# Patient Record
Sex: MALE | Race: WHITE | Employment: OTHER | URBAN - METROPOLITAN AREA
[De-identification: names, ages, dates, MRNs, and addresses within clinical notes are randomized per-mention and may not be internally consistent; named-entity substitution may affect disease eponyms.]

---

## 2017-01-17 RX ORDER — ATORVASTATIN CALCIUM 20 MG/1
TABLET, FILM COATED ORAL
Qty: 44 TAB | Refills: 0 | OUTPATIENT
Start: 2017-01-17

## 2017-01-17 NOTE — TELEPHONE ENCOUNTER
Patient overdue for fasting labs and a follow-up appointment. Per MD, may authorize refills once appointment is made.

## 2017-02-16 DIAGNOSIS — I11.9 BENIGN HYPERTENSIVE HEART DISEASE WITHOUT HEART FAILURE: ICD-10-CM

## 2017-02-24 RX ORDER — ATORVASTATIN CALCIUM 20 MG/1
TABLET, FILM COATED ORAL
Qty: 44 TAB | Refills: 0 | OUTPATIENT
Start: 2017-02-24

## 2017-02-24 RX ORDER — AMLODIPINE BESYLATE 5 MG/1
TABLET ORAL
Qty: 15 TAB | Refills: 0 | OUTPATIENT
Start: 2017-02-24

## 2017-02-24 RX ORDER — QUINAPRIL 20 MG/1
TABLET ORAL
Qty: 15 TAB | Refills: 0 | OUTPATIENT
Start: 2017-02-24

## 2017-02-24 NOTE — TELEPHONE ENCOUNTER
Received fax from [facility]. No visit scheduled yet. Per MD, note faxed back that pt overdue for visit/fasting labs- once scheduled, can refill.

## 2017-02-28 DIAGNOSIS — I11.9 BENIGN HYPERTENSIVE HEART DISEASE WITHOUT HEART FAILURE: ICD-10-CM

## 2017-02-28 RX ORDER — AMLODIPINE BESYLATE 5 MG/1
TABLET ORAL
Qty: 30 TAB | Refills: 5 | Status: SHIPPED | OUTPATIENT
Start: 2017-02-28

## 2017-02-28 RX ORDER — ATORVASTATIN CALCIUM 20 MG/1
TABLET, FILM COATED ORAL
Qty: 30 TAB | Refills: 5 | Status: SHIPPED | OUTPATIENT
Start: 2017-02-28

## 2017-02-28 RX ORDER — QUINAPRIL 20 MG/1
TABLET ORAL
Qty: 30 TAB | Refills: 5 | Status: SHIPPED | OUTPATIENT
Start: 2017-02-28

## 2017-03-27 ENCOUNTER — HOSPITAL ENCOUNTER (OUTPATIENT)
Dept: GENERAL RADIOLOGY | Age: 62
Discharge: HOME | End: 2017-03-27
Payer: COMMERCIAL

## 2017-03-27 DIAGNOSIS — R06.02 SHORTNESS OF BREATH: ICD-10-CM

## 2017-03-27 PROCEDURE — 71020 XR CHEST PA LAT: CPT

## 2017-04-05 ENCOUNTER — HOSPITAL ENCOUNTER (OUTPATIENT)
Dept: CT IMAGING | Age: 62
Discharge: HOME | End: 2017-04-05
Attending: INTERNAL MEDICINE
Payer: SELF-PAY

## 2017-04-05 DIAGNOSIS — Z87.891 HISTORY OF TOBACCO USE: ICD-10-CM

## 2017-04-05 PROCEDURE — G0297 LDCT FOR LUNG CA SCREEN: HCPCS

## 2019-04-10 ENCOUNTER — HOSPITAL ENCOUNTER (OUTPATIENT)
Dept: GENERAL RADIOLOGY | Age: 64
Discharge: HOME | End: 2019-04-10
Attending: INTERNAL MEDICINE
Payer: COMMERCIAL

## 2019-04-10 DIAGNOSIS — B34.9 VIRAL INFECTION: ICD-10-CM

## 2019-04-10 PROCEDURE — 71046 X-RAY EXAM CHEST 2 VIEWS: CPT

## 2019-04-10 PROCEDURE — 74019 RADEX ABDOMEN 2 VIEWS: CPT

## 2019-05-07 ENCOUNTER — OFFICE VISIT (OUTPATIENT)
Dept: CARDIOLOGY CLINIC | Age: 64
End: 2019-05-07

## 2019-05-07 VITALS
WEIGHT: 175.8 LBS | BODY MASS INDEX: 25.17 KG/M2 | HEART RATE: 68 BPM | DIASTOLIC BLOOD PRESSURE: 70 MMHG | SYSTOLIC BLOOD PRESSURE: 140 MMHG | HEIGHT: 70 IN | OXYGEN SATURATION: 98 %

## 2019-05-07 DIAGNOSIS — E88.81 INSULIN RESISTANCE: ICD-10-CM

## 2019-05-07 DIAGNOSIS — I10 ESSENTIAL HYPERTENSION: Primary | ICD-10-CM

## 2019-05-07 DIAGNOSIS — E78.5 DYSLIPIDEMIA: ICD-10-CM

## 2019-05-07 DIAGNOSIS — R93.1 AGATSTON CAC SCORE, <100: ICD-10-CM

## 2019-05-07 DIAGNOSIS — I11.9 BENIGN HYPERTENSIVE HEART DISEASE WITHOUT HEART FAILURE: ICD-10-CM

## 2019-05-07 DIAGNOSIS — I65.23 BILATERAL CAROTID ARTERY STENOSIS: ICD-10-CM

## 2019-05-07 RX ORDER — ICOSAPENT ETHYL 1000 MG/1
2 CAPSULE ORAL 2 TIMES DAILY WITH MEALS
Qty: 120 CAP | Refills: 5 | Status: SHIPPED | OUTPATIENT
Start: 2019-05-07 | End: 2019-10-20

## 2019-05-07 NOTE — PROGRESS NOTES
Vanessa KARYDrea Dasilva [address]  [address] Luc[address] Lynne, Jr [place]  [place], [address] [facility]    Office [phone]  Fax [phone]  Cell [phone]        Anais Gracia is a 70 y.o. male . Last seen by me in 2015. Assessment  Encounter Diagnoses   Name Primary?  Essential hypertension Yes    Bilateral carotid artery stenosis     Dyslipidemia     Agatston CAC score, <100     Insulin resistance     Benign hypertensive heart disease without heart failure        Recommendations:    1. CAD by CT heart scan 2015 (71, LAD). Stress echo 2013 normal. He does describe exertional fatigue and occasional ROSS particular w. Stairs. Will reassess with stress echo in near future. 2. Mild carotid disease by duplex study in 2013, asx. Recheck duplex study in near future. 3. HTN, controlled on combination therapy. 4. Dislipidemia/pre-diabetes. Significant worsening mostly related to his poor diet (ice cream, PBJs) despite interval weight loss. We had along discussion about clean eating, low carb diet. He seems motivated to do better. Continue Atorva 20/D and replace krill oil with Vascepa 2 capsules twice daily. Then recheck numbers with Dr. Lorenza Jesus in 4 months. 5. Phone follow up after reviewing tests        Follow-up and Dispositions    · Return in about 4 months (around 9/7/2019). Subjective:    Pt reports that he has recently recovered from having influenza for the past 3-4 weeks. He states he now feels good. Pt reports lack of exercise. He reports he has some fatigue when walking up steep stairs. Pt discontinued atorvastatin and BP medication 2 years ago but resumed all medications about 2 weeks ago. Recent labs w/ Dr Lorenza Jesus show  and . He reports he eats a large amount of pistachio ice cream every night. He adds he typically has oatmeal for breakfast, yogurt and banana for lunch, and peanut butter Jelly sandwich for dinner. Pt supplements diet with krill oil. Pt mentions he has family hx of stroke (father). Patient denies any exertional chest pain, dyspnea, palpitations, syncope, orthopnea, edema or paroxysmal nocturnal dyspnea. Cardiac risk factors   HTN yes  DM - prediabetes  Smoking no      Cardiac testing  Echo Oct 2012 - LVH, EF 60%, mild LAE    Stress echo 4/16/2013 - 10 min, +EKG, normal stress echo    Carotid duplex 4/16/2013 - 10-49% bilateral    CT Heart Scan 10/21/15 - Ca score 73, in LAD territory     Past Medical History:   Diagnosis Date    Anxiety     anxiety    Benign hypertensive heart disease without heart failure 12/2/2012    Carotid disease, bilateral (HCC)     mild    Insulin resistance         Current Outpatient Medications   Medication Sig Dispense Refill    KRILL OIL PO Take  by mouth.  tadalafil (CIALIS PO) Take  by mouth.  TESTOSTERONE IM by IntraMUSCular route Once every 2 weeks.  icosapent ethyl (VASCEPA) 1 gram capsule Take 2 Caps by mouth two (2) times daily (with meals). 120 Cap 5    atorvastatin (LIPITOR) 20 mg tablet Take 1 tablet by mouth once daily. 30 Tab 5    amLODIPine (NORVASC) 5 mg tablet Take 1 tablet by mouth once daily. 30 Tab 5    quinapril (ACCUPRIL) 20 mg tablet Take 1 tablet by mouth once daily. 30 Tab 5    aspirin delayed-release 81 mg tablet Take  by mouth daily.  OTHER folate      OM-3/DHA/EPA/FISH OIL/VIT D3 (FISH OIL-VIT D3 PO) Take  by mouth. No Known Allergies       Review of Systems  Constitutional: Negative for fever, chills, malaise/fatigue and diaphoresis. Respiratory: Negative for cough, hemoptysis, sputum production, shortness of breath and wheezing. Cardiovascular: Negative for chest pain, palpitations, orthopnea, claudication, leg swelling and PND. Gastrointestinal: Negative for heartburn, nausea, vomiting, blood in stool and melena. Genitourinary: Negative for dysuria and flank pain. Musculoskeletal: Negative for joint pain and back pain.   Skin: Negative for rash.  Neurological: Negative for focal weakness, seizures, loss of consciousness, weakness and headaches. Endo/Heme/Allergies: Does not bruise/bleed easily. Psychiatric/Behavioral: Negative for memory loss. The patient does not have insomnia. Physical Exam    Visit Vitals  /70 (BP 1 Location: Left arm, BP Patient Position: Sitting)   Pulse 68   Ht 5' 10\" (1.778 m)   Wt 175 lb 12.8 oz (79.7 kg)   SpO2 98%   BMI 25.22 kg/m²     Wt Readings from Last 3 Encounters:   05/07/19 175 lb 12.8 oz (79.7 kg)   08/11/15 194 lb (88 kg)   04/07/14 182 lb (82.6 kg)      General - well developed well nourished  Neck - JVP normal, thyroid nl  Cardiac - normal S1, S2, no murmurs, rubs or gallops. No clicks  Vascular - carotids without bruits, radials, femorals and pedal pulses equal bilateral  Lungs - clear to auscultation bilaterals, no rales, wheezing or rhonchi  Abd - soft nontender, no HSM, no abd bruits  Extremities - no edema  Skin - no rash  Neuro - nonfocal  Psych - normal mood and affect        Written by Brissa Carlos, as dictated by Dr. Deisi Talyor.    Deisi Taylor MD

## 2019-05-07 NOTE — PROGRESS NOTES
Patient has shortness of breath sometime       Visit Vitals  /70 (BP 1 Location: Left arm, BP Patient Position: Sitting)   Pulse 68   Ht 5' 10\" (1.778 m)   Wt 175 lb 12.8 oz (79.7 kg)   SpO2 98%   BMI 25.22 kg/m²

## 2019-05-07 NOTE — LETTER
5/12/19 Patient: Ina Dee Sr. YOB: 1955 Date of Visit: 5/7/2019 Essie Clark, [address] [place] [address] [address] [facility] [address] VIA Facsimile: [phone] Dear Essie Clark MD, Thank you for referring Mr. Piedad Rodriguez to [address] for evaluation. My notes for this consultation are attached. If you have questions, please do not hesitate to call me. I look forward to following your patient along with you. Sincerely, Marcelino Davison MD

## 2019-05-12 PROBLEM — R93.1 AGATSTON CAC SCORE, <100: Status: ACTIVE | Noted: 2019-05-12

## 2019-06-03 ENCOUNTER — TELEPHONE (OUTPATIENT)
Dept: CARDIOLOGY CLINIC | Age: 64
End: 2019-06-03

## 2019-06-03 NOTE — TELEPHONE ENCOUNTER
----- Message from Kevin Middleton NP sent at 5/31/2019  3:21 PM EDT -----  Please notify Mr. Martinez that his carotid duplex is unchanged and his stress test was negative.      Continue heart healthy clean eating, statin, recently started Vascepa, and follow up with PCP in 4 months for repeat lipids.   ----- Message -----  From: Grover Bowen MD  Sent: 5/30/2019  11:36 AM  To: Rodo Nielsen MD

## 2019-06-03 NOTE — TELEPHONE ENCOUNTER
Left message on VM of carotid duplex and stress test results.  Encouraged to call back with any questions

## 2019-07-11 ENCOUNTER — TELEPHONE (OUTPATIENT)
Dept: CARDIOLOGY CLINIC | Age: 64
End: 2019-07-11

## 2019-10-20 RX ORDER — ICOSAPENT ETHYL 1000 MG/1
2 CAPSULE ORAL 2 TIMES DAILY WITH MEALS
Qty: 360 CAP | Refills: 3 | Status: SHIPPED | OUTPATIENT
Start: 2019-10-20 | End: 2020-10-15

## 2020-03-12 ENCOUNTER — HOSPITAL ENCOUNTER (OUTPATIENT)
Dept: GENERAL RADIOLOGY | Age: 65
Discharge: HOME | End: 2020-03-12
Attending: FAMILY MEDICINE
Payer: COMMERCIAL

## 2020-03-12 DIAGNOSIS — R05.9 COUGH: ICD-10-CM

## 2020-03-12 PROCEDURE — 71046 X-RAY EXAM CHEST 2 VIEWS: CPT

## 2020-06-02 LAB — SARS-COV-2 AB, IGG, CORG1M: NEGATIVE

## 2020-06-03 ENCOUNTER — HOSPITAL ENCOUNTER (OUTPATIENT)
Dept: GENERAL RADIOLOGY | Age: 65
Discharge: HOME | End: 2020-06-03
Attending: INTERNAL MEDICINE
Payer: COMMERCIAL

## 2020-06-03 ENCOUNTER — TELEPHONE (OUTPATIENT)
Dept: CARDIOLOGY CLINIC | Age: 65
End: 2020-06-03

## 2020-06-03 DIAGNOSIS — R05.9 COUGH: ICD-10-CM

## 2020-06-03 LAB — SARS-COV-2 AB, IGM, CVABMT: NEGATIVE

## 2020-06-03 PROCEDURE — 71046 X-RAY EXAM CHEST 2 VIEWS: CPT

## 2020-06-03 NOTE — TELEPHONE ENCOUNTER
Anita with Dr Gerber Zhu office is calling to schedule a time that Dr Chuy Ramon can call and speak with Dr Naty Banegas about the patient.      Phone: [phone]

## 2020-07-16 ENCOUNTER — HOSPITAL ENCOUNTER (OUTPATIENT)
Dept: GENERAL RADIOLOGY | Age: 65
Discharge: HOME | End: 2020-07-16
Attending: INTERNAL MEDICINE
Payer: COMMERCIAL

## 2020-07-16 DIAGNOSIS — R91.8 LUNG MASS: ICD-10-CM

## 2020-07-16 PROCEDURE — 71046 X-RAY EXAM CHEST 2 VIEWS: CPT

## 2020-08-27 ENCOUNTER — HOSPITAL ENCOUNTER (OUTPATIENT)
Dept: ULTRASOUND IMAGING | Age: 65
Discharge: HOME | End: 2020-08-27
Attending: INTERNAL MEDICINE
Payer: COMMERCIAL

## 2020-08-27 DIAGNOSIS — M79.604 RIGHT LEG PAIN: ICD-10-CM

## 2020-08-27 PROCEDURE — 93971 EXTREMITY STUDY: CPT

## 2020-10-15 RX ORDER — ICOSAPENT ETHYL 1000 MG/1
CAPSULE ORAL
Qty: 360 CAP | Refills: 3 | Status: SHIPPED | OUTPATIENT
Start: 2020-10-15 | End: 2021-01-18 | Stop reason: SDUPTHER

## 2020-11-05 ENCOUNTER — TRANSCRIBE ORDER (OUTPATIENT)
Dept: GENERAL RADIOLOGY | Age: 65
End: 2020-11-05

## 2020-11-05 ENCOUNTER — HOSPITAL ENCOUNTER (OUTPATIENT)
Dept: GENERAL RADIOLOGY | Age: 65
Discharge: HOME | End: 2020-11-05
Attending: INTERNAL MEDICINE
Payer: COMMERCIAL

## 2020-11-05 DIAGNOSIS — R94.2 NONSPECIFIC ABNORMAL RESULTS OF PULMONARY SYSTEM FUNCTION STUDY: Primary | ICD-10-CM

## 2020-11-05 DIAGNOSIS — R94.2 NONSPECIFIC ABNORMAL RESULTS OF PULMONARY SYSTEM FUNCTION STUDY: ICD-10-CM

## 2020-11-05 PROCEDURE — 71046 X-RAY EXAM CHEST 2 VIEWS: CPT

## 2021-01-18 RX ORDER — ICOSAPENT ETHYL 1000 MG/1
CAPSULE ORAL
Qty: 360 CAP | Refills: 3 | Status: SHIPPED | OUTPATIENT
Start: 2021-01-18

## 2021-02-10 ENCOUNTER — TELEPHONE (OUTPATIENT)
Dept: CARDIOLOGY CLINIC | Age: 66
End: 2021-02-10

## 2021-02-10 NOTE — TELEPHONE ENCOUNTER
----- Message from Simeon Vo LPN sent at 5/1/2699  3:13 PM EST -----  Regarding: RE: vascepa  I totally understand thank you for checking  ----- Message -----  From: Jamison Dhillon LPN  Sent: 8/1/0749   3:05 PM EST  To: Syl Turner LPN, Simeon Vo LPN  Subject: vascepa                                          Old patient of Gisele Beckett and he cancelled his appointment tomorrow. I will hold off on doing Auth.perhaps he will follow Kelsie Frye at his new practice.

## 2021-03-09 ENCOUNTER — TRANSCRIBE ORDER (OUTPATIENT)
Dept: GENERAL RADIOLOGY | Age: 66
End: 2021-03-09

## 2021-03-09 ENCOUNTER — HOSPITAL ENCOUNTER (OUTPATIENT)
Dept: GENERAL RADIOLOGY | Age: 66
Discharge: HOME | End: 2021-03-09
Attending: INTERNAL MEDICINE
Payer: COMMERCIAL

## 2021-03-09 DIAGNOSIS — R94.2 ABNORMAL PULMONARY FUNCTION TEST: Primary | ICD-10-CM

## 2021-03-09 DIAGNOSIS — R94.2 ABNORMAL PULMONARY FUNCTION TEST: ICD-10-CM

## 2021-03-09 PROCEDURE — 71046 X-RAY EXAM CHEST 2 VIEWS: CPT

## 2021-03-25 ENCOUNTER — TRANSCRIBE ORDER (OUTPATIENT)
Dept: SCHEDULING | Age: 66
End: 2021-03-25

## 2021-03-25 DIAGNOSIS — Z87.891 PERSONAL HISTORY OF TOBACCO USE, PRESENTING HAZARDS TO HEALTH: Primary | ICD-10-CM

## 2021-05-06 ENCOUNTER — HOSPITAL ENCOUNTER (OUTPATIENT)
Dept: CT IMAGING | Age: 66
Discharge: HOME | End: 2021-05-06
Attending: INTERNAL MEDICINE
Payer: COMMERCIAL

## 2021-05-06 DIAGNOSIS — Z87.891 PERSONAL HISTORY OF TOBACCO USE, PRESENTING HAZARDS TO HEALTH: ICD-10-CM

## 2021-05-06 PROCEDURE — 71271 CT THORAX LUNG CANCER SCR C-: CPT

## 2021-07-01 ENCOUNTER — TRANSCRIBE ORDER (OUTPATIENT)
Dept: SCHEDULING | Age: 66
End: 2021-07-01

## 2021-07-01 DIAGNOSIS — I65.23 BILATERAL CAROTID ARTERY OCCLUSION: Primary | ICD-10-CM

## 2021-09-10 ENCOUNTER — HOSPITAL ENCOUNTER (OUTPATIENT)
Dept: ULTRASOUND IMAGING | Age: 66
Discharge: HOME | End: 2021-09-10
Attending: INTERNAL MEDICINE
Payer: COMMERCIAL

## 2021-09-10 DIAGNOSIS — I65.23 BILATERAL CAROTID ARTERY OCCLUSION: ICD-10-CM

## 2021-09-10 PROCEDURE — 93880 EXTRACRANIAL BILAT STUDY: CPT

## 2021-09-13 LAB
LEFT ICA/CCA SYS: 1
RIGHT ICA/CCA SYS: 1.1

## 2022-03-19 PROBLEM — R93.1 AGATSTON CAC SCORE, <100: Status: ACTIVE | Noted: 2019-05-12

## 2023-10-17 ENCOUNTER — HOSPITAL ENCOUNTER (OUTPATIENT)
Age: 68
Discharge: HOME | End: 2023-10-20
Payer: COMMERCIAL

## 2023-10-17 DIAGNOSIS — Z87.891 PERSONAL HISTORY OF TOBACCO USE: ICD-10-CM

## 2023-10-17 DIAGNOSIS — F17.211 CIGARETTE NICOTINE DEPENDENCE IN REMISSION: ICD-10-CM

## 2023-10-17 PROCEDURE — 71271 CT THORAX LUNG CANCER SCR C-: CPT

## 2024-09-09 ENCOUNTER — TRANSCRIBE ORDERS (OUTPATIENT)
Facility: HOSPITAL | Age: 69
End: 2024-09-09

## 2024-09-09 DIAGNOSIS — I65.23 OCCLUSION AND STENOSIS OF BILATERAL CAROTID ARTERIES: ICD-10-CM

## 2024-09-09 DIAGNOSIS — R74.8 ABNORMAL LEVELS OF OTHER SERUM ENZYMES: Primary | ICD-10-CM

## 2024-09-13 ENCOUNTER — TRANSCRIBE ORDERS (OUTPATIENT)
Facility: HOSPITAL | Age: 69
End: 2024-09-13

## 2024-09-13 DIAGNOSIS — R19.09 LEFT ILIAC FOSSA MASS: Primary | ICD-10-CM

## 2024-09-24 ENCOUNTER — APPOINTMENT (OUTPATIENT)
Age: 69
End: 2024-09-24
Payer: COMMERCIAL

## 2024-09-24 ENCOUNTER — HOSPITAL ENCOUNTER (OUTPATIENT)
Age: 69
Discharge: HOME | End: 2024-09-27
Payer: COMMERCIAL

## 2024-09-24 DIAGNOSIS — R74.8 ABNORMAL LEVELS OF OTHER SERUM ENZYMES: ICD-10-CM

## 2024-09-24 PROCEDURE — 76700 US EXAM ABDOM COMPLETE: CPT

## 2024-09-25 ENCOUNTER — TRANSCRIBE ORDERS (OUTPATIENT)
Facility: HOSPITAL | Age: 69
End: 2024-09-25

## 2024-09-25 DIAGNOSIS — R19.00 PELVIC MASS: Primary | ICD-10-CM

## 2024-09-30 ENCOUNTER — HOSPITAL ENCOUNTER (OUTPATIENT)
Facility: HOSPITAL | Age: 69
Discharge: HOME | End: 2024-10-03
Attending: INTERNAL MEDICINE
Payer: COMMERCIAL

## 2024-09-30 VITALS — HEIGHT: 68 IN | WEIGHT: 170 LBS | BODY MASS INDEX: 25.76 KG/M2

## 2024-09-30 DIAGNOSIS — R19.00 PELVIC MASS: ICD-10-CM

## 2024-09-30 LAB
GLUCOSE BLD STRIP.AUTO-MCNC: 99 MG/DL (ref 65–117)
SERVICE CMNT-IMP: NORMAL

## 2024-09-30 PROCEDURE — A9609 HC RX DIAGNOSTIC RADIOPHARMACEUTICAL: HCPCS | Performed by: INTERNAL MEDICINE

## 2024-09-30 PROCEDURE — 78815 PET IMAGE W/CT SKULL-THIGH: CPT

## 2024-09-30 PROCEDURE — 82962 GLUCOSE BLOOD TEST: CPT

## 2024-09-30 PROCEDURE — 3430000000 HC RX DIAGNOSTIC RADIOPHARMACEUTICAL: Performed by: INTERNAL MEDICINE

## 2024-09-30 RX ORDER — FLUDEOXYGLUCOSE F-18 500 MCI/ML
10 INJECTION INTRAVENOUS
Status: COMPLETED | OUTPATIENT
Start: 2024-09-30 | End: 2024-09-30

## 2024-09-30 RX ADMIN — FLUDEOXYGLUCOSE F-18 10 MILLICURIE: 500 INJECTION INTRAVENOUS at 07:29
